# Patient Record
Sex: MALE | Race: WHITE | NOT HISPANIC OR LATINO | Employment: STUDENT | URBAN - METROPOLITAN AREA
[De-identification: names, ages, dates, MRNs, and addresses within clinical notes are randomized per-mention and may not be internally consistent; named-entity substitution may affect disease eponyms.]

---

## 2017-08-29 ENCOUNTER — ALLSCRIPTS OFFICE VISIT (OUTPATIENT)
Dept: OTHER | Facility: OTHER | Age: 27
End: 2017-08-29

## 2017-08-29 ENCOUNTER — GENERIC CONVERSION - ENCOUNTER (OUTPATIENT)
Dept: OTHER | Facility: OTHER | Age: 27
End: 2017-08-29

## 2017-10-02 ENCOUNTER — ALLSCRIPTS OFFICE VISIT (OUTPATIENT)
Dept: OTHER | Facility: OTHER | Age: 27
End: 2017-10-02

## 2017-10-02 DIAGNOSIS — R42 DIZZINESS AND GIDDINESS: ICD-10-CM

## 2017-10-02 DIAGNOSIS — G45.0 VERTEBRO-BASILAR ARTERY SYNDROME: ICD-10-CM

## 2017-10-03 NOTE — CONSULTS
Assessment  1  Dizziness (780 4) (R42)   2  Vertigo (780 4) (R42)   3  Vertebrobasilar artery insufficiency (435 3) (G45 0)    Plan  Vertebrobasilar artery insufficiency    · CTA HEAD AND NECK W WO CONTRAST; Status:Need Information - Financial  Authorization; Requested IRT:[id number];    Perform:[facility]; SZP:[id number]; Ordered; For:Vertebrobasilar artery insufficiency; Ordered By:Mattie Alexis;  Vertigo    · * MRI BRAIN IAC WO AND W CONTRAST; Status:Need Information - Financial  Authorization; Requested MWY:[id number];    Perform:[facility]; ZYC:[id number]; Ordered; For:Vertigo; Ordered By:Mattie Alexis;    Discussion/Summary  Discussion Summary:   Patient's vertigo with bending of his neck is concerning for vertebrobasilar insufficiency, kinking of vertebral artery  Will get CTA head and neck to look for stenosis, aneurysm, avm  also get MRI brain with IAC protocol to make sure there's no tumor at CP angle or lesion in inner ear canal      Counseling Documentation With Imm: The patient was counseled regarding instructions for management,-risk factor reductions,-prognosis,-patient and family education,-impressions,-risks and benefits of treatment options,-importance of compliance with treatment  total time of encounter was 45 [id number] minutes was spent counseling  Goals and Barriers: The patient has the current Goals: To have no more spells of dizziness  Chief Complaint  Chief Complaint Free Text Note Form: headaches      History of Present Illness  HPI: Mr Buddy Ambrosio is a 32 yo M with no significant PMH presents with cc of few neurological concerns  Patient says that 3 years ago, he was playing volleyball by beach and then had sxs of lightheadedness, vertigo which was positional for few days  He was told he had inflamed cervical spine  He's unsure of what imaging was done  A month ago, he was inside car singing and he felt pressure in his ears, tingling in back of head   One day in Spring he was in school, all of a sudden felt lightheaded, hot and when got fresh air, felt better  He denies headaches  He denies episodes of confusion  At times if he bends his neck too much, feels dizzy  He denies episodes of focal weakness or paresthesia  He denies neck pain  He does get almost passing out episodes  Denies visual disturbance  father had pituitary tumor in his 19's  Review of Systems  Neurological ROS:   Constitutional: no fever, no chills, no recent weight gain, no recent weight loss, no complaints of feeling tired, no changes in appetite  Neurological General: no headache,-no nausea or vomiting,-no lightheadedness,-no convulsions,-no syncope-and-no trauma  Neurological Cranial Nerves: vertigo or dizziness  Active Problems  1  Chronic rhinitis (472 0) (J31 0)   2  Mass of right side of neck (784 2) (R22 1)   3  Nasal septal deviation (470) (J34 2)    Surgical History  1  History of Eye Surgery    Family History  Mother    1  Family history of malignant melanoma (V16 8) (Z80 8)  Grandfather    2  Family history of diabetes mellitus (V18 0) (Z83 3)  Uncle    3  Family history of diabetes mellitus (V18 0) (Z83 3)    Social History   · Never a smoker   · Rarely consumes alcohol (V49 89) (Z78 9)   · Single    Current Meds   1  No Reported Medications Recorded    Allergies  1  No Known Drug Allergies    Vitals  Signs   Recorded: [id number] 04:07PM   Heart Rate: 53  Systolic: 410, LUE  Diastolic: 67, LUE  Height: 5 ft 6 in  Weight: 142 lb   BMI Calculated: 22 92  BSA Calculated: 1 73  O2 Saturation: 99    Physical Exam    Constitutional   General appearance: No acute distress, well appearing and well nourished  Eyes   Ophthalmoscopic examination: Abnormal   Bilateral optic discs: normal    Musculoskeletal   Gait and station: Normal gait, stance and balance  Muscle strength: Normal strength throughout  Muscle tone: No atrophy, abnormal movements, flaccidity, cogwheeling or spasticity  Neurologic   Orientation to person, place, and time: Normal     Recent and remote memory: Demonstrates normal memory  Attention span and concentration: Normal thought process and attention span  Language: Names objects, able to repeat phrases and speaks spontaneously  Fund of knowledge: Normal vocabulary with appropriate knowledge of current events and past history  2nd cranial nerve: Normal     3rd, 4th, and 6th cranial nerves: Normal     5th cranial nerve: Normal     7th cranial nerve: Normal     8th cranial nerve: Normal     9th cranial nerve: Normal     11th cranial nerve: Normal     12th cranial nerve: Normal     Sensation: Normal     Reflexes: Normal     Coordination: Normal        Future Appointments    Date/Time Provider Specialty Site   01/16/2018 12:00 PM Lida Najjar, M D   Otolaryngology ST [address] [place] [address] [facility]     Signatures   Electronically signed by : SHANELL Puga ; Oct  2 2017  4:58PM EST                       (Author)

## 2017-10-05 ENCOUNTER — TRANSCRIBE ORDERS (OUTPATIENT)
Dept: ADMINISTRATIVE | Facility: HOSPITAL | Age: 27
End: 2017-10-05

## 2017-10-05 DIAGNOSIS — G45.0 BASILAR ARTERY SYNDROME: ICD-10-CM

## 2017-10-05 DIAGNOSIS — R42 DIZZINESS: Primary | ICD-10-CM

## 2017-10-16 ENCOUNTER — HOSPITAL ENCOUNTER (OUTPATIENT)
Dept: RADIOLOGY | Facility: HOSPITAL | Age: 27
Discharge: HOME | End: 2017-10-16
Attending: PSYCHIATRY & NEUROLOGY
Payer: COMMERCIAL

## 2017-10-16 DIAGNOSIS — G45.0 VERTEBRO-BASILAR ARTERY SYNDROME: ICD-10-CM

## 2017-10-16 DIAGNOSIS — R42 DIZZINESS AND GIDDINESS: ICD-10-CM

## 2017-10-16 PROCEDURE — 70496 CT ANGIOGRAPHY HEAD: CPT

## 2017-10-16 PROCEDURE — A9585 GADOBUTROL INJECTION: HCPCS | Performed by: PSYCHIATRY & NEUROLOGY

## 2017-10-16 PROCEDURE — 70498 CT ANGIOGRAPHY NECK: CPT

## 2017-10-16 PROCEDURE — 70553 MRI BRAIN STEM W/O & W/DYE: CPT

## 2017-10-16 RX ADMIN — IOHEXOL 85 ML: 350 INJECTION, SOLUTION INTRAVENOUS at 14:04

## 2017-10-16 RX ADMIN — GADOBUTROL 6 ML: 604.72 INJECTION INTRAVENOUS at 13:27

## 2018-01-13 VITALS
WEIGHT: 142 LBS | DIASTOLIC BLOOD PRESSURE: 67 MMHG | BODY MASS INDEX: 22.82 KG/M2 | OXYGEN SATURATION: 99 % | HEART RATE: 53 BPM | SYSTOLIC BLOOD PRESSURE: 120 MMHG | HEIGHT: 66 IN

## 2018-01-22 VITALS
BODY MASS INDEX: 23.46 KG/M2 | WEIGHT: 146 LBS | DIASTOLIC BLOOD PRESSURE: 76 MMHG | SYSTOLIC BLOOD PRESSURE: 118 MMHG | HEIGHT: 66 IN

## 2018-03-07 NOTE — CONSULTS
Assessment    1  Mass of right side of neck (784 2) (R22 1)   2  Chronic rhinitis (472 0) (J31 0)   3  Nasal septal deviation (470) (J34 2)    Chief Complaint  Chief Complaint Free Text Note Form: Patient presents for lump behind right ear  LB,CMA      History of Present Illness  HPI: Mr Reynaldo Silver presents today as a new patient due to lump on right neck  He noticed the mass over one to two years ago  he reports no changes in the mass over the past few months  It is not painful to touch  No increase in size  He is concerned as his mother has had melanoma  No testing or imaging of the area  Additional ENT concerns include nasal congestion and obstruction  Poor nasal airflow on the left  No current medications  He is a prior  with injuries to the face  Per bed mate he does snore  Review of Systems  [facility]   Eyes: No complaints of itching, excessive tearing or vision changes  Ears: No complaints of hearing loss, discharge, imbalance, recent ear infections, or tinnitus  Nose: No nasal obstruction, no discharge or runniness, no bleeding, no dryness, no sneezing and no loss of smell  Mouth: No sores in mouth, no altered taste, no dental problems  Throat: No complaints of throat pain, no difficulty swallowing, no hoarseness  Neck: lump or swelling in the neck, but no soreness and no neck pain  Genitourinary: No complaints of dysuria, flank pain or frequent urination  Cardiovascular: No complaints of chest pain or palpitations  Respiratory: No complaints of shortness of breath, cough or wheezing  Gastrointestinal: No complaints of heartburn, nausea/vomiting, or constipation  Neurological: No complaints of headache, convulsions or memory loss  Constitutional: No fever, feels well, no tiredness  Psychiatric: No anxiety, no depression, no sleep disturbances  Musculoskeletal: No complaints of arthralgias, myalgias or limb pain     Integumentary: No complaints of skin rash, itching or skin lesions  Endocrine: No complaints of proptosis, muscle weakness or feelings of weakness  Hematologic/Lymphatic: No complaints of swollen glands in the neck, does not bleed easily, does not bruise easily  ROS Reviewed:   ROS reviewed  Past Medical History  Past Medical History Reviewed: The past medical history was reviewed and updated today  Surgical History  Surgical History Reviewed: The surgical history was reviewed and updated today  Family History  Mother    1  Family history of malignant melanoma (V16 8) (Z80 8)  Grandfather    2  Family history of diabetes mellitus (V18 0) (Z83 3)  Uncle    3  Family history of diabetes mellitus (V18 0) (Z83 3)  Family History Reviewed: The family history was reviewed and updated today  Social History    · Never a smoker   · Rarely consumes alcohol (V49 89) (Z78 9)   · Single  Social History Reviewed: The social history was reviewed and updated today  Current Meds   1  No Reported Medications Recorded    Allergies    1  No Known Drug Allergies    Vitals  Signs   Recorded: [id number] 55:26PF   Systolic: 636  Diastolic: 76  Height: 5 ft 6 in  Weight: 146 lb   BMI Calculated: 23 57  BSA Calculated: 1 75  Pain Scale: 0    Physical Exam    Constitutional:   General appearance: Well developed, well nourished  Ability to communicate: Voice normal  Speech normal    Head and Face:   Head and face: Head normocephalic, atraumatic with no lesions or palpable masses  Submandibular glands and parotid glands: non tender, no masses  Eyes:   Test of Ocular Motility: Gaze normal  No nystagmus  Ears:   Otoscopic Examination: Tympanic membranes intact and normal in appearance, no retraction of tympanic membranes observed, no serous effusion observed, no evidence of tympanosclerosis      Hearing: Normal    Nose:   External auditory canals: No cerumen impaction noted, no drainage observed, no edema noted in EAC, no exostoses present, no osteoma present, no tenderness noted  External Inspection of Nose: No deformities observed, no deviation of bone structure, no skin lesion present, no swelling present  Nares are symmetric, no deviation of caudal portion of septum  Nasal mucosa, septum, and turbinates: Abnormal  examination of the septum showed deviation to the left Right nasal turbintates: abnormal inferior turbinate  Left nasal turbinates: abnormal inferior turbinate  unable to visualize due to gag reflex  Mouth: Inspection of Lips, Teeth, Gums: Lips normal in color, moist, no cracks or lesions  No loose teeth, no missing teeth  Gingiva: no bleeding observed, no inflammation present  Hard Palate: no asymmetry observed, no torus present  Soft palate normal with no ulcers noted  Throat:   Examination of Oropharynx: Oral Mucosa: no masses, lesions, leukoplakia, or scarring  Normal Jaquelin's ducts, pink and moist, no discoloration noted  Floor of mouth: normal Warthin's ducts, no lesions, ulcerations, leukoplakia or torus mandibularis  Tonsils: no hypertrophy or ulcerations noted  Tongue: normal mobility, surfaces without fissures, leukoplakia, ulceration or masses, not enlarged, no pallor noted, no white patches present  unable to visualize due to gag reflex  Neck:   Neck: Abnormal  The neck had a(n) cm mass  Examination of Thyroid: Normal size, non-tender, no palpable masses  Pulmonary:   Respiratory effort: Normal respiratory rate and rhythm, no increased work of breathing  Cardiovascular:   Inspection of Peripheral vascular system by observation: Normal    Lymphatic:   Palpation of Lymph Nodes: Neck: No generalized lymphadenopathy  Neurological/Psychiatric:   Cranial nerves II-VII grossly intact  Oriented to person, place, and time  Cooperative, in no acute distress  Discussion/Summary  Discussion Summary:   Mr Rosa Gautam presents today as a new patient due to right neck mass  Palpable soft mass right neck  Reviewed possible causes including lipoma, enlarged lymph node, parotid enlargement  Reviewed changes to notify including increase in size, painful to touch, change in skin color  Reviewed treatment options including CT scan, ultrasound, FNAB, surgical removal, or watchful monitoring  Pt agreed to watchful monitoring of the site  Regarding chronic rhinitis and nasal septal deviation, reviewed options for treatment  DIscussed use of saline daily, nasal steroids, imaging if worsens, allergy testing  Follow up as needed for nasal sinus symptoms  Goals and Barriers: The patient has the current Goals: Monitor right neck mass  Patient's Capacity to Self-Care: Patient is able to Self-Care  Medication SE Review and Pt Understands Tx: The treatment plan was reviewed with the patient/guardian   The patient/guardian understands and agrees with the treatment plan      Signatures   Electronically signed by : SHANELL Vidal ; Aug 29 2017  3:02PM EST                       (Author)

## 2023-11-21 NOTE — ASU PATIENT PROFILE, ADULT - NS PREOP UNDERSTANDS INFO
No solid food/dairy/candy/gum after 23:00pm tonight; water allowed before 06:00am tomorrow; patient to come with photo ID/insurance/credit card; no jewelries/contact lens/valuables; dress in comfortable clothes; no smoking/alcohol drinking/recreational drug use tonight; escort to come with photo ID; address and callback number was given/yes

## 2023-11-21 NOTE — ASU PATIENT PROFILE, ADULT - NSICDXPASTSURGICALHX_GEN_ALL_CORE_FT
PAST SURGICAL HISTORY:  H/O eye surgery bilateral    H/O ureter repair     History of oral surgery wisdom teeth extraction    Myringotomy tube status     S/P colonoscopy      PAST SURGICAL HISTORY:  H/O eye surgery bilateral muscle surgery    H/O ureter repair     History of oral surgery wisdom teeth extraction    Myringotomy tube status     S/P colonoscopy

## 2023-11-22 ENCOUNTER — OUTPATIENT (OUTPATIENT)
Dept: OUTPATIENT SERVICES | Facility: HOSPITAL | Age: 33
LOS: 1 days | Discharge: HOME | End: 2023-11-22

## 2023-11-22 VITALS
DIASTOLIC BLOOD PRESSURE: 67 MMHG | RESPIRATION RATE: 16 BRPM | HEART RATE: 62 BPM | TEMPERATURE: 97 F | OXYGEN SATURATION: 99 % | SYSTOLIC BLOOD PRESSURE: 130 MMHG

## 2023-11-22 VITALS
OXYGEN SATURATION: 100 % | SYSTOLIC BLOOD PRESSURE: 139 MMHG | HEIGHT: 64 IN | HEART RATE: 64 BPM | RESPIRATION RATE: 16 BRPM | WEIGHT: 133.38 LBS | DIASTOLIC BLOOD PRESSURE: 53 MMHG | TEMPERATURE: 98 F

## 2023-11-22 DIAGNOSIS — Z96.22 MYRINGOTOMY TUBE(S) STATUS: Chronic | ICD-10-CM

## 2023-11-22 DIAGNOSIS — Z98.890 OTHER SPECIFIED POSTPROCEDURAL STATES: Chronic | ICD-10-CM

## 2023-11-22 DEVICE — SURGIFLO HEMOSTATIC MATRIX KIT: Type: IMPLANTABLE DEVICE | Site: BILATERAL | Status: FUNCTIONAL

## 2023-11-22 RX ORDER — ACETAMINOPHEN 500 MG
650 TABLET ORAL ONCE
Refills: 0 | Status: COMPLETED | OUTPATIENT
Start: 2023-11-22 | End: 2023-11-22

## 2023-11-22 RX ORDER — OXYCODONE HYDROCHLORIDE 5 MG/1
5 TABLET ORAL ONCE
Refills: 0 | Status: DISCONTINUED | OUTPATIENT
Start: 2023-11-22 | End: 2023-11-22

## 2023-11-22 RX ORDER — FENTANYL CITRATE 50 UG/ML
25 INJECTION INTRAVENOUS ONCE
Refills: 0 | Status: DISCONTINUED | OUTPATIENT
Start: 2023-11-22 | End: 2023-11-22

## 2023-11-22 RX ORDER — SODIUM CHLORIDE 9 MG/ML
1000 INJECTION, SOLUTION INTRAVENOUS
Refills: 0 | Status: DISCONTINUED | OUTPATIENT
Start: 2023-11-22 | End: 2023-11-22

## 2023-11-22 RX ORDER — ONDANSETRON 8 MG/1
4 TABLET, FILM COATED ORAL ONCE
Refills: 0 | Status: DISCONTINUED | OUTPATIENT
Start: 2023-11-22 | End: 2023-11-22

## 2023-11-22 RX ADMIN — Medication 650 MILLIGRAM(S): at 11:44

## 2023-11-22 RX ADMIN — Medication 650 MILLIGRAM(S): at 11:40

## 2023-11-22 NOTE — BRIEF OPERATIVE NOTE - NSICDXBRIEFPREOP_GEN_ALL_CORE_FT
PRE-OP DIAGNOSIS:  Deviated septum 22-Nov-2023 09:32:14  Renee Arauz  Nasal turbinate hypertrophy 22-Nov-2023 09:32:40  Renee Arauz

## 2023-11-22 NOTE — PRE-ANESTHESIA EVALUATION ADULT - NSANTHOSAYNRD_GEN_A_CORE
No. ZAIDA screening performed.  STOP BANG Legend: 0-2 = LOW Risk; 3-4 = INTERMEDIATE Risk; 5-8 = HIGH Risk

## 2023-11-22 NOTE — ASU DISCHARGE PLAN (ADULT/PEDIATRIC) - CARE PROVIDER_API CALL
Renee Arauz  Otolaryngology  [address] [place] [address], [address] [place] [address], [place] [address]  Phone: [phone]  Fax: [phone]  Established Patient  Follow Up Time:

## 2023-12-11 NOTE — PRE-ANESTHESIA EVALUATION ADULT - SPO2 (%)
Pt is requesting a refill for metoPROLOL tartrate (LOPRESSOR) 50 MG tablet and ALPRAZolam (XANAX) 0.25 MG tablet. Confirmed pharmacy [facility] on [address] in [place]. Please advise, thank you.   100

## (undated) DEVICE — SLV COMPRESSION KNEE MED

## (undated) DEVICE — GOWN ROYAL SILK XL

## (undated) DEVICE — SUT PLAIN GUT 4-0 18" SC-1

## (undated) DEVICE — DRAPE MAYO STAND 23"

## (undated) DEVICE — DRSG TELFA 3 X 8

## (undated) DEVICE — SOL ANTI FOG

## (undated) DEVICE — WARMING BLANKET LOWER ADULT

## (undated) DEVICE — S&N ARTHROCARE ENT WAND REFLEX ULTRA PTR

## (undated) DEVICE — SUT CHROMIC 4-0 18" G-3

## (undated) DEVICE — GLV 6 PROTEXIS (WHITE)

## (undated) DEVICE — PACK RHINOPLASTY

## (undated) DEVICE — PETRI DISH MED 3.5"

## (undated) DEVICE — GLV 6.5 PROTEXIS (WHITE)